# Patient Record
Sex: FEMALE | Race: WHITE | NOT HISPANIC OR LATINO | URBAN - METROPOLITAN AREA
[De-identification: names, ages, dates, MRNs, and addresses within clinical notes are randomized per-mention and may not be internally consistent; named-entity substitution may affect disease eponyms.]

---

## 2020-07-25 ENCOUNTER — TELEPHONE ENCOUNTER (OUTPATIENT)
Dept: URBAN - METROPOLITAN AREA CLINIC 13 | Facility: CLINIC | Age: 70
End: 2020-07-25

## 2020-07-25 RX ORDER — POLYETHYLENE GLYCOL 3350, SODIUM SULFATE, SODIUM CHLORIDE, POTASSIUM CHLORIDE, ASCORBIC ACID, SODIUM ASCORBATE 7.5-2.691G
USE AS DIRECTED KIT ORAL
Qty: 1 | Refills: 0 | OUTPATIENT
Start: 2013-05-06 | End: 2013-09-17

## 2020-07-25 RX ORDER — NITROFURANTOIN MONOHYDRATE/MACROCRYSTALLINE 25; 75 MG/1; MG/1
CAPSULE ORAL
Refills: 0 | OUTPATIENT
End: 2017-03-28

## 2020-07-25 RX ORDER — CIPROFLOXACIN HYDROCHLORIDE 500 MG/1
TAKE 1 TABLET TWICE A DAY TABLET, FILM COATED ORAL
Qty: 180 | Refills: 0 | OUTPATIENT
End: 2017-03-28

## 2020-07-25 RX ORDER — DICYCLOMINE HYDROCHLORIDE 10 MG/1
TAKE 1 CAPSULE EVERY 6 HOURS PRN ABDOMINAL PAIN CAPSULE ORAL
Qty: 30 | Refills: 1 | OUTPATIENT
Start: 2018-03-22 | End: 2018-06-13

## 2020-07-25 RX ORDER — POLYETHYLENE GLYCOL 3350, SODIUM SULFATE, SODIUM CHLORIDE, POTASSIUM CHLORIDE, ASCORBIC ACID, SODIUM ASCORBATE 7.5-2.691G
USE AS DIRECTED KIT ORAL
Qty: 1 | Refills: 0 | OUTPATIENT
Start: 2018-03-22 | End: 2018-06-13

## 2020-07-25 RX ORDER — OMEPRAZOLE 40 MG/1
TAKE 1 CAPSULE DAILY CAPSULE, DELAYED RELEASE ORAL
Qty: 30 | Refills: 1 | OUTPATIENT
Start: 2013-07-10 | End: 2016-07-21

## 2020-07-25 RX ORDER — METOPROLOL TARTRATE 25 MG/1
TAKE 1 TABLET DAILY PRN TABLET, FILM COATED ORAL
Refills: 0 | OUTPATIENT
End: 2018-03-22

## 2020-07-25 RX ORDER — PANTOPRAZOLE SODIUM 40 MG/1
TABLET, DELAYED RELEASE ORAL
Refills: 0 | OUTPATIENT
End: 2018-03-22

## 2020-07-25 RX ORDER — ESTROGEN,CON/M-PROGEST ACET 0.3-1.5MG
TAKE 1 TABLET DAILY TABLET ORAL
Refills: 0 | OUTPATIENT
End: 2016-07-21

## 2020-07-26 ENCOUNTER — TELEPHONE ENCOUNTER (OUTPATIENT)
Dept: URBAN - METROPOLITAN AREA CLINIC 13 | Facility: CLINIC | Age: 70
End: 2020-07-26

## 2022-06-09 ENCOUNTER — ESTABLISHED PATIENT (OUTPATIENT)
Dept: URBAN - METROPOLITAN AREA CLINIC 20 | Facility: CLINIC | Age: 72
End: 2022-06-09

## 2022-06-09 DIAGNOSIS — H02.834: ICD-10-CM

## 2022-06-09 DIAGNOSIS — H10.13: ICD-10-CM

## 2022-06-09 DIAGNOSIS — H01.02A: ICD-10-CM

## 2022-06-09 DIAGNOSIS — H25.813: ICD-10-CM

## 2022-06-09 DIAGNOSIS — H04.123: ICD-10-CM

## 2022-06-09 DIAGNOSIS — H02.831: ICD-10-CM

## 2022-06-09 PROCEDURE — 92014 COMPRE OPH EXAM EST PT 1/>: CPT

## 2022-06-09 ASSESSMENT — TONOMETRY
OD_IOP_MMHG: 12
OS_IOP_MMHG: 13

## 2022-06-09 ASSESSMENT — KERATOMETRY
OS_K2POWER_DIOPTERS: 47.00
OS_K1POWER_DIOPTERS: 45.25
OD_AXISANGLE2_DEGREES: 88
OD_AXISANGLE_DEGREES: 178
OS_AXISANGLE2_DEGREES: 88
OD_K1POWER_DIOPTERS: 43.50
OD_K2POWER_DIOPTERS: 44.25
OS_AXISANGLE_DEGREES: 178

## 2022-06-09 ASSESSMENT — VISUAL ACUITY
OS_SC: 20/70
OD_SC: 20/25-1
OU_SC: 20/20-1
OU_SC: 20/25

## 2023-06-20 ENCOUNTER — OFFICE VISIT (OUTPATIENT)
Dept: URBAN - METROPOLITAN AREA CLINIC 107 | Facility: CLINIC | Age: 73
End: 2023-06-20

## 2023-06-20 ENCOUNTER — OFFICE VISIT (OUTPATIENT)
Dept: URBAN - METROPOLITAN AREA CLINIC 107 | Facility: CLINIC | Age: 73
End: 2023-06-20
Payer: MEDICARE

## 2023-06-20 ENCOUNTER — WEB ENCOUNTER (OUTPATIENT)
Dept: URBAN - METROPOLITAN AREA CLINIC 107 | Facility: CLINIC | Age: 73
End: 2023-06-20

## 2023-06-20 ENCOUNTER — LAB OUTSIDE AN ENCOUNTER (OUTPATIENT)
Dept: URBAN - METROPOLITAN AREA CLINIC 107 | Facility: CLINIC | Age: 73
End: 2023-06-20

## 2023-06-20 VITALS
TEMPERATURE: 97.5 F | HEART RATE: 70 BPM | WEIGHT: 145.4 LBS | SYSTOLIC BLOOD PRESSURE: 114 MMHG | HEIGHT: 68 IN | RESPIRATION RATE: 18 BRPM | BODY MASS INDEX: 22.04 KG/M2 | DIASTOLIC BLOOD PRESSURE: 67 MMHG

## 2023-06-20 DIAGNOSIS — R13.19 ESOPHAGEAL DYSPHAGIA: ICD-10-CM

## 2023-06-20 DIAGNOSIS — K21.9 GERD WITHOUT ESOPHAGITIS: ICD-10-CM

## 2023-06-20 DIAGNOSIS — Z80.0 FAMILY HISTORY OF COLON CANCER IN FATHER: ICD-10-CM

## 2023-06-20 PROBLEM — 40890009: Status: ACTIVE | Noted: 2023-06-20

## 2023-06-20 PROBLEM — 266435005: Status: ACTIVE | Noted: 2023-06-20

## 2023-06-20 PROBLEM — 312824007: Status: ACTIVE | Noted: 2023-06-20

## 2023-06-20 PROCEDURE — 99204 OFFICE O/P NEW MOD 45 MIN: CPT | Performed by: NURSE PRACTITIONER

## 2023-06-20 RX ORDER — MV-MN/OM3/DHA/EPA/FISH/LUT/ZEA 250-5-1 MG
AS DIRECTED CAPSULE ORAL
Status: ACTIVE | COMMUNITY

## 2023-06-20 RX ORDER — POLYETHYLENE GLYCOL 3350, SODIUM SULFATE, SODIUM CHLORIDE, POTASSIUM CHLORIDE, ASCORBIC ACID, SODIUM ASCORBATE 140-9-5.2G
AS DIRECTED KIT ORAL
Qty: 1 PACKAGE | Refills: 0 | OUTPATIENT
Start: 2023-06-20 | End: 2023-06-21

## 2023-06-20 RX ORDER — ROSUVASTATIN CALCIUM 5 MG/1
1 TABLET TABLET, COATED ORAL ONCE A DAY
Status: ACTIVE | COMMUNITY

## 2023-06-20 RX ORDER — OMEPRAZOLE 20 MG/1
1 CAPSULE 30 MINUTES BEFORE MORNING MEAL CAPSULE, DELAYED RELEASE ORAL
Status: ACTIVE | COMMUNITY

## 2023-06-20 RX ORDER — ACETAMINOPHEN 500 MG
1 TABLET TABLET ORAL ONCE A DAY
Status: ACTIVE | COMMUNITY

## 2023-06-20 NOTE — HPI-TODAY'S VISIT:
This is a 73-year-old woman with a paternal history of colon cancer due screening in June 2023 and a personal history of mitral valve prolapse, atrial fibrillation, GERD, dysphagia status post empiric dilation (2016), colon diverticulosis, and left lower quadrant abdominal pain presenting after a long interval to discuss EGD and colonoscopy. She was last seen in the office for a visit in March 2018.  She was complaining of intermittent left lower quadrant pain that was suspected to be associated with spasm rather than diverticulitis.  She was prescribed dicyclomine.  She was to call for persistent pain.  Antibiotics and CT were future considerations.  She did not return for follow-up.  She was due colon cancer screening.  A colonoscopy in June 2019 was notable for diverticulosis and was otherwise normal. For acid reflux, she has been taking Prilosec 20 mg as needed.  She reports two significant nocturnal episodes of acid reflux over the last 6 months, the last one was 1 month ago and was "severe."  Episodes are described as waking with burning at the base of her throat with pain and burning in her esophagus that radiates to her back lasting 3 hours.  The following day, during breakfast, she felt as though it was difficult to pass food through her esophagus which does not occur with subsequent meals.  She denies daytime symptoms of acid reflux.  She denies NSAID use.  She denies abdominal pain, nausea, or any other abdominal symptoms.  She benefited from dilation 8 years ago.  She is concerned regarding her esophagus and is asking for an EGD at the time of her colonoscopy.

## 2023-07-27 ENCOUNTER — OFFICE VISIT (OUTPATIENT)
Dept: URBAN - METROPOLITAN AREA MEDICAL CENTER 19 | Facility: MEDICAL CENTER | Age: 73
End: 2023-07-27
Payer: MEDICARE

## 2023-07-27 DIAGNOSIS — R13.14 DYSPHAGIA, PHARYNGOESOPHAGEAL PHASE: ICD-10-CM

## 2023-07-27 DIAGNOSIS — K31.89 OTHER DISEASES OF STOMACH AND DUODENUM: ICD-10-CM

## 2023-07-27 DIAGNOSIS — Z80.0 BROTHER AT YOUNG AGE FAMILY HISTORY OF COLON CANCER: ICD-10-CM

## 2023-07-27 DIAGNOSIS — K21.9 GERD WITHOUT ESOPHAGITIS: ICD-10-CM

## 2023-07-27 PROCEDURE — G0105 COLORECTAL SCRN; HI RISK IND: HCPCS | Performed by: INTERNAL MEDICINE

## 2023-07-27 PROCEDURE — 43239 EGD BIOPSY SINGLE/MULTIPLE: CPT | Performed by: INTERNAL MEDICINE

## 2023-07-27 PROCEDURE — 43248 EGD GUIDE WIRE INSERTION: CPT | Performed by: INTERNAL MEDICINE

## 2023-07-27 RX ORDER — MV-MN/OM3/DHA/EPA/FISH/LUT/ZEA 250-5-1 MG
AS DIRECTED CAPSULE ORAL
Status: ACTIVE | COMMUNITY

## 2023-07-27 RX ORDER — ACETAMINOPHEN 500 MG
1 TABLET TABLET ORAL ONCE A DAY
Status: ACTIVE | COMMUNITY

## 2023-07-27 RX ORDER — OMEPRAZOLE 20 MG/1
1 CAPSULE 30 MINUTES BEFORE MORNING MEAL CAPSULE, DELAYED RELEASE ORAL
Status: ACTIVE | COMMUNITY

## 2023-07-27 RX ORDER — ROSUVASTATIN CALCIUM 5 MG/1
1 TABLET TABLET, COATED ORAL ONCE A DAY
Status: ACTIVE | COMMUNITY

## 2023-08-21 ENCOUNTER — OFFICE VISIT (OUTPATIENT)
Dept: URBAN - METROPOLITAN AREA CLINIC 113 | Facility: CLINIC | Age: 73
End: 2023-08-21

## 2023-09-05 ENCOUNTER — DASHBOARD ENCOUNTERS (OUTPATIENT)
Age: 73
End: 2023-09-05

## 2023-09-05 ENCOUNTER — OFFICE VISIT (OUTPATIENT)
Dept: URBAN - METROPOLITAN AREA CLINIC 107 | Facility: CLINIC | Age: 73
End: 2023-09-05
Payer: MEDICARE

## 2023-09-05 VITALS
DIASTOLIC BLOOD PRESSURE: 64 MMHG | HEART RATE: 75 BPM | BODY MASS INDEX: 21.52 KG/M2 | SYSTOLIC BLOOD PRESSURE: 123 MMHG | HEIGHT: 68 IN | RESPIRATION RATE: 18 BRPM | WEIGHT: 142 LBS | TEMPERATURE: 96.9 F

## 2023-09-05 DIAGNOSIS — K57.30 COLON, DIVERTICULOSIS: ICD-10-CM

## 2023-09-05 DIAGNOSIS — Z80.0 FAMILY HISTORY OF COLON CANCER IN FATHER: ICD-10-CM

## 2023-09-05 DIAGNOSIS — K21.9 GERD WITHOUT ESOPHAGITIS: ICD-10-CM

## 2023-09-05 PROBLEM — 733657002: Status: ACTIVE | Noted: 2023-09-05

## 2023-09-05 PROCEDURE — 99213 OFFICE O/P EST LOW 20 MIN: CPT | Performed by: NURSE PRACTITIONER

## 2023-09-05 RX ORDER — FAMOTIDINE 20 MG/1
1 TABLET AT BEDTIME AS NEEDED TABLET, FILM COATED ORAL ONCE A DAY
Qty: 90 TABLET | Refills: 3 | OUTPATIENT
Start: 2023-09-05

## 2023-09-05 RX ORDER — MV-MN/OM3/DHA/EPA/FISH/LUT/ZEA 250-5-1 MG
AS DIRECTED CAPSULE ORAL
Status: ON HOLD | COMMUNITY

## 2023-09-05 RX ORDER — OMEPRAZOLE 20 MG/1
1 CAPSULE 30 MINUTES BEFORE MORNING MEAL CAPSULE, DELAYED RELEASE ORAL
Status: ON HOLD | COMMUNITY

## 2023-09-05 RX ORDER — ROSUVASTATIN CALCIUM 5 MG/1
1 TABLET TABLET, COATED ORAL ONCE A DAY
Status: ACTIVE | COMMUNITY

## 2023-09-05 RX ORDER — ACETAMINOPHEN 500 MG
1 TABLET TABLET ORAL ONCE A DAY
Status: ON HOLD | COMMUNITY

## 2023-09-05 NOTE — HPI-OTHER HISTORIES
Colonoscopy 7/27/2023:BBPS 8, sigmoid diverticulosis, grade 1 nonbleeding internal hemorrhoids.  Due to her increased risk for colon cancer, repeat colonoscopy recommended in 2028. EGD 7/27/2023:No endoscopic abnormality to explain patient's dysphagia status post empiric dilation with 16 mm Savary dilator, nonerosive gastropathy status postbiopsy, normal duodenum.  Pathology: Antral and body biopsies demonstrated gastric antrum and body type mucosa with no significant diagnostic alteration.  No evidence of H. pylori, intestinal metaplasia, dysplasia.

## 2023-09-05 NOTE — HPI-TODAY'S VISIT:
This is a 73-year-old woman with a paternal history of colon cancer and a personal history of mitral valve prolapse, atrial fibrillation, GERD, dysphagia status post empiric dilation (2016), colon diverticulosis, and left lower quadrant abdominal pain presenting for follow-up. She was last seen in the office 6/20/2023 reporting infrequent nocturnal symptoms of acid reflux resulting in mild difficulty swallowing the following morning.  These episodes have lasted up to 3 hours.  Lifestyle modification was discussed regarding acid reflux.  She was to use Pepcid or Pepcid Complete as needed and take a dose prophylactically at bedtime if she anticipated symptoms.  She was scheduled for an EGD with possible dilation.  She was due screening due to a paternal history of colon cancer.  This was also scheduled. Colonoscopy 7/27/2023:BBPS 8, sigmoid diverticulosis, grade 1 nonbleeding internal hemorrhoids.  Due to her increased risk for colon cancer, repeat colonoscopy recommended in 2028. EGD 7/27/2023:No endoscopic abnormality to explain patient's dysphagia status post empiric dilation with 16 mm Savary dilator, nonerosive gastropathy status postbiopsy, normal duodenum.  Pathology: Antral and body biopsies demonstrated gastric antrum and body type mucosa with no significant diagnostic alteration.  No evidence of H. pylori, intestinal metaplasia, dysplasia. She reports acid reflux symptoms if she eats a large meal or eats late.  She has been able to anticipate possible symptoms and has been using Pepcid Complete.  This is effective in treating her symptoms or providing prophylaxis.  She is having regular bowel movements.  She denies any other abdominal symptoms.  She puts flaxseed or Yonny seeds in her smoothie every morning.  She has a family history of complicated diverticulitis requiring surgery with her mother and son.

## 2024-10-10 ENCOUNTER — OFFICE VISIT (OUTPATIENT)
Dept: URBAN - METROPOLITAN AREA CLINIC 107 | Facility: CLINIC | Age: 74
End: 2024-10-10
Payer: MEDICARE

## 2024-10-10 VITALS
SYSTOLIC BLOOD PRESSURE: 145 MMHG | WEIGHT: 147 LBS | TEMPERATURE: 97.3 F | OXYGEN SATURATION: 100 % | BODY MASS INDEX: 22.28 KG/M2 | HEIGHT: 68 IN | DIASTOLIC BLOOD PRESSURE: 76 MMHG | RESPIRATION RATE: 18 BRPM | HEART RATE: 82 BPM

## 2024-10-10 DIAGNOSIS — Z80.0 FAMILY HISTORY OF COLON CANCER IN FATHER: ICD-10-CM

## 2024-10-10 DIAGNOSIS — R14.0 ABDOMINAL BLOATING: ICD-10-CM

## 2024-10-10 DIAGNOSIS — K57.30 COLON, DIVERTICULOSIS: ICD-10-CM

## 2024-10-10 DIAGNOSIS — K21.9 GERD WITHOUT ESOPHAGITIS: ICD-10-CM

## 2024-10-10 PROCEDURE — 99214 OFFICE O/P EST MOD 30 MIN: CPT

## 2024-10-10 RX ORDER — ACETAMINOPHEN 500 MG
1 TABLET TABLET ORAL ONCE A DAY
Status: ACTIVE | COMMUNITY

## 2024-10-10 RX ORDER — ROSUVASTATIN CALCIUM 5 MG/1
1 TABLET TABLET, COATED ORAL ONCE A DAY
Status: ACTIVE | COMMUNITY

## 2024-10-10 RX ORDER — FAMOTIDINE 20 MG/1
1 TABLET AT BEDTIME AS NEEDED TABLET, FILM COATED ORAL ONCE A DAY
Qty: 90 TABLET | Refills: 3

## 2024-10-10 RX ORDER — OMEPRAZOLE 40 MG/1
1 CAPSULE 30 MINUTES BEFORE MORNING MEAL CAPSULE, DELAYED RELEASE ORAL ONCE A DAY
Status: ACTIVE | COMMUNITY

## 2024-10-10 RX ORDER — FAMOTIDINE 20 MG/1
1 TABLET AT BEDTIME AS NEEDED TABLET, FILM COATED ORAL ONCE A DAY
Qty: 90 TABLET | Refills: 3 | Status: ACTIVE | COMMUNITY
Start: 2023-09-05

## 2024-10-10 NOTE — HPI-TODAY'S VISIT:
Ms. Schwartz is a 74-year-old woman with paternal history of colon cancer personal history of mitral valve prolapse, atrial fibrillation, GERD, dysphagia status post dilation (2016), colon diverticulosis and left lower quadrant abdominal pain presenting for follow-up.  She was last seen in office on 9/5/2023 her colon diverticulosis which was asymptomatic at the time recommend she continue daily flaxseed.  Paternal history of colon cancer.  Recent colonoscopy negative for polyps.  She will be due repeat screening colonoscopy in 2028.  Regarding her GERD symptoms she is taking Pepcid Complete as needed for breakthrough symptoms.  This was effective.  She will request a prescription in order to reduce medical cost.  She may pare this with antiacid if needed.  Most recent labs available for review from 7/3/2024 showed normal CBC, low BUN 7, normal LFTs, cholesterol 213 with LDL cholesterol 138.  Today she reports in the evening she noticed some abdominal bloating. She did recently switch back to her regular diet from plant-based (hungryroot). Her bowels move regularly without blood per rectum or melena. She still has intermittent acid reflux, she is now taking Omeprazole 40mg and famotidine as needed for breakthrough symptoms this seems to work well.